# Patient Record
Sex: MALE | Race: AMERICAN INDIAN OR ALASKA NATIVE
[De-identification: names, ages, dates, MRNs, and addresses within clinical notes are randomized per-mention and may not be internally consistent; named-entity substitution may affect disease eponyms.]

---

## 2018-01-15 ENCOUNTER — HOSPITAL ENCOUNTER (EMERGENCY)
Dept: HOSPITAL 5 - ED | Age: 58
Discharge: HOME | End: 2018-01-15
Payer: COMMERCIAL

## 2018-01-15 VITALS — SYSTOLIC BLOOD PRESSURE: 106 MMHG | DIASTOLIC BLOOD PRESSURE: 69 MMHG

## 2018-01-15 DIAGNOSIS — Z79.899: ICD-10-CM

## 2018-01-15 DIAGNOSIS — R40.4: Primary | ICD-10-CM

## 2018-01-15 LAB
ALBUMIN SERPL-MCNC: 4.4 G/DL (ref 3.9–5)
ALT SERPL-CCNC: 32 UNITS/L (ref 7–56)
BACTERIA #/AREA URNS HPF: (no result) /HPF
BASOPHILS # (AUTO): 0.1 K/MM3 (ref 0–0.1)
BASOPHILS NFR BLD AUTO: 1 % (ref 0–1.8)
BENZODIAZEPINES SCREEN,URINE: (no result)
BILIRUB DIRECT SERPL-MCNC: < 0.2 MG/DL (ref 0–0.2)
BILIRUB UR QL STRIP: (no result)
BLOOD UR QL VISUAL: (no result)
BUN SERPL-MCNC: 14 MG/DL (ref 9–20)
BUN/CREAT SERPL: 11 %
CALCIUM SERPL-MCNC: 9 MG/DL (ref 8.4–10.2)
EOSINOPHIL # BLD AUTO: 0 K/MM3 (ref 0–0.4)
EOSINOPHIL NFR BLD AUTO: 0.4 % (ref 0–4.3)
HCT VFR BLD CALC: 45.8 % (ref 35.5–45.6)
HEMOLYSIS INDEX: 2
HGB BLD-MCNC: 15.4 GM/DL (ref 11.8–15.2)
HYALINE CASTS #/AREA URNS LPF: 8 /LPF
LYMPHOCYTES # BLD AUTO: 0.9 K/MM3 (ref 1.2–5.4)
LYMPHOCYTES NFR BLD AUTO: 13.6 % (ref 13.4–35)
MCH RBC QN AUTO: 31 PG (ref 28–32)
MCHC RBC AUTO-ENTMCNC: 34 % (ref 32–34)
MCV RBC AUTO: 91 FL (ref 84–94)
METHADONE SCREEN,URINE: (no result)
MONOCYTES # (AUTO): 0.5 K/MM3 (ref 0–0.8)
MONOCYTES % (AUTO): 7.2 % (ref 0–7.3)
MUCOUS THREADS #/AREA URNS HPF: (no result) /HPF
NITRITE UR QL STRIP: (no result)
OPIATE SCREEN,URINE: (no result)
PH UR STRIP: 5 [PH] (ref 5–7)
PLATELET # BLD: 141 K/MM3 (ref 140–440)
RBC # BLD AUTO: 5.06 M/MM3 (ref 3.65–5.03)
RBC #/AREA URNS HPF: 2 /HPF (ref 0–6)
UROBILINOGEN UR-MCNC: < 2 MG/DL (ref ?–2)
WBC #/AREA URNS HPF: 1 /HPF (ref 0–6)

## 2018-01-15 PROCEDURE — 93010 ELECTROCARDIOGRAM REPORT: CPT

## 2018-01-15 PROCEDURE — 93005 ELECTROCARDIOGRAM TRACING: CPT

## 2018-01-15 PROCEDURE — 99284 EMERGENCY DEPT VISIT MOD MDM: CPT

## 2018-01-15 PROCEDURE — 80074 ACUTE HEPATITIS PANEL: CPT

## 2018-01-15 PROCEDURE — 70450 CT HEAD/BRAIN W/O DYE: CPT

## 2018-01-15 PROCEDURE — 81001 URINALYSIS AUTO W/SCOPE: CPT

## 2018-01-15 PROCEDURE — 80307 DRUG TEST PRSMV CHEM ANLYZR: CPT

## 2018-01-15 PROCEDURE — 80048 BASIC METABOLIC PNL TOTAL CA: CPT

## 2018-01-15 PROCEDURE — 80320 DRUG SCREEN QUANTALCOHOLS: CPT

## 2018-01-15 PROCEDURE — 36415 COLL VENOUS BLD VENIPUNCTURE: CPT

## 2018-01-15 PROCEDURE — G0480 DRUG TEST DEF 1-7 CLASSES: HCPCS

## 2018-01-15 PROCEDURE — 85025 COMPLETE CBC W/AUTO DIFF WBC: CPT

## 2018-01-15 PROCEDURE — 71046 X-RAY EXAM CHEST 2 VIEWS: CPT

## 2018-01-15 NOTE — EMERGENCY DEPARTMENT REPORT
ED General Adult HPI





- General


Stated complaint: OVERDOSE


Time Seen by Provider: 01/15/18 10:00





- History of Present Illness


Initial comments: 


The only information I received on this patient was a triage form that states 

overdose.  I did not receive a paramedic report.  The charge nurse tells me 

that the paramedics informed them that that the patient reported to them that 

he took a "orange pill".  However at the time of my encounter the patient is 

totally nonverbal.  He is protecting his airway.  He is gesturing with his 

hands.  He is following commands.  He is not providing any historical 

information.





I received a call from the patient's family who state they are coming to the 

hospital but are more than 2 hours away.  They deny that this patient has any 

history of psychiatric or mental health issues or has been taking any related 

medication.  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 

receives care at.  They state the only medicine that he takes is something for 

high blood pressure.





-: unknown





- Related Data


 Allergies











Allergy/AdvReac Type Severity Reaction Status Date / Time


 


No Known Allergies Allergy   Verified 01/15/18 09:35














ED Review of Systems


ROS: 


Stated complaint: OVERDOSE


Other details as noted in HPI





Comment: Unobtainable due to pts medical conditions





ED Past Medical Hx





- Past Medical History


Hx Hypertension: Yes





- Surgical History


Past Surgical History?: No





- Social History


Substance Use Type: Other (unknown see above history of "orange pill")





ED Physical Exam





- General


Limitations: Altered Mental Status


General appearance: alert, in no apparent distress





- Head


Head exam: Present: atraumatic, normocephalic





- Eye


Eye exam: Present: normal appearance.  Absent: scleral icterus





- ENT


ENT exam: Present: mucous membranes moist





- Neck


Neck exam: Present: normal inspection





- Respiratory


Respiratory exam: Present: normal lung sounds bilaterally.  Absent: respiratory 

distress





- Cardiovascular


Cardiovascular Exam: Present: regular rate, normal rhythm.  Absent: systolic 

murmur, diastolic murmur, rubs, gallop





- GI/Abdominal


GI/Abdominal exam: Present: soft, normal bowel sounds.  Absent: distended, 

tenderness, guarding, rebound, rigid





- Rectal


Rectal exam: Present: deferred





- Extremities Exam


Extremities exam: Present: normal inspection





- Back Exam


Back exam: Present: normal inspection





- Neurological Exam


Neurological exam: Present: alert, oriented X3, CN II-XII intact (on limited 

exam).  Absent: motor sensory deficit





- Psychiatric


Psychiatric exam: Present: depressed, flat affect, other (patient will follow 

basic commands.  However he does not respond verbally.  He gestures but does 

not speak.)





- Skin


Skin exam: Present: warm, dry, intact, normal color.  Absent: rash





ED Course





- Reevaluation(s)


Reevaluation #1: 


Patient will be worked up for altered mental status to include CT evaluation.  

The patient is not communicating with the nurse somewhat coherently.  He states 

that his son gave him something to eat which was milky with black dots.  He 

states that he tried it and then 40 minutes later he started getting hot and 

dizzy.  Apparently he experienced some vertigo as well.  EMS was summoned.


01/15/18 10:54





Reevaluation #2: 


The patient shared with the  that he consumes some sort of 

substance laced candy bar.  Whether it contained THC or not I do not know.  

However his marijuana screen is negative.  The patient has been fully 

ambulatory.  On reexamination he states that he feels better and he is ready to 

go home.  He is appropriate for outpatient disposition.


01/15/18 14:46








ED Medical Decision Making





- Lab Data


Result diagrams: 


 01/15/18 10:46





 01/15/18 10:46








 Laboratory Results - last 24 hr











  01/15/18 01/15/18 01/15/18





  10:46 10:46 10:46


 


WBC   


 


RBC   


 


Hgb   


 


Hct   


 


MCV   


 


MCH   


 


MCHC   


 


RDW   


 


Plt Count   


 


Lymph % (Auto)   


 


Mono % (Auto)   


 


Eos % (Auto)   


 


Baso % (Auto)   


 


Lymph #   


 


Mono #   


 


Eos #   


 


Baso #   


 


Seg Neutrophils %   


 


Seg Neutrophils #   


 


Sodium  140  


 


Potassium  3.8  


 


Chloride  99.5  


 


Carbon Dioxide  26  


 


Anion Gap  18  


 


BUN  14  


 


Creatinine  1.3  


 


Estimated GFR  57  


 


BUN/Creatinine Ratio  11  


 


Glucose  159 H  


 


Calcium  9.0  


 


Total Bilirubin    0.40


 


Direct Bilirubin    < 0.2


 


AST    31


 


ALT    32


 


Alkaline Phosphatase    75


 


Total Protein    7.3


 


Albumin    4.4


 


Albumin/Globulin Ratio    1.5


 


Plasma/Serum Alcohol   < 0.01 














  01/15/18





  10:46


 


WBC  6.6


 


RBC  5.06 H


 


Hgb  15.4 H


 


Hct  45.8 H


 


MCV  91


 


MCH  31


 


MCHC  34


 


RDW  13.8


 


Plt Count  141


 


Lymph % (Auto)  13.6


 


Mono % (Auto)  7.2


 


Eos % (Auto)  0.4


 


Baso % (Auto)  1.0


 


Lymph #  0.9 L


 


Mono #  0.5


 


Eos #  0.0


 


Baso #  0.1


 


Seg Neutrophils %  77.8 H


 


Seg Neutrophils #  5.1


 


Sodium 


 


Potassium 


 


Chloride 


 


Carbon Dioxide 


 


Anion Gap 


 


BUN 


 


Creatinine 


 


Estimated GFR 


 


BUN/Creatinine Ratio 


 


Glucose 


 


Calcium 


 


Total Bilirubin 


 


Direct Bilirubin 


 


AST 


 


ALT 


 


Alkaline Phosphatase 


 


Total Protein 


 


Albumin 


 


Albumin/Globulin Ratio 


 


Plasma/Serum Alcohol 














 Laboratory Results - last 24 hr











  01/15/18 01/15/18 01/15/18





  10:46 10:46 10:46


 


WBC   


 


RBC   


 


Hgb   


 


Hct   


 


MCV   


 


MCH   


 


MCHC   


 


RDW   


 


Plt Count   


 


Lymph % (Auto)   


 


Mono % (Auto)   


 


Eos % (Auto)   


 


Baso % (Auto)   


 


Lymph #   


 


Mono #   


 


Eos #   


 


Baso #   


 


Seg Neutrophils %   


 


Seg Neutrophils #   


 


Sodium    140


 


Potassium    3.8


 


Chloride    99.5


 


Carbon Dioxide    26


 


Anion Gap    18


 


BUN    14


 


Creatinine    1.3


 


Estimated GFR    57


 


BUN/Creatinine Ratio    11


 


Glucose    159 H


 


Calcium    9.0


 


Total Bilirubin   


 


Direct Bilirubin   


 


AST   


 


ALT   


 


Alkaline Phosphatase   


 


Total Protein   


 


Albumin   


 


Albumin/Globulin Ratio   


 


Urine Color   


 


Urine Turbidity   


 


Urine pH   


 


Ur Specific Gravity   


 


Urine Protein   


 


Urine Glucose (UA)   


 


Urine Ketones   


 


Urine Blood   


 


Urine Nitrite   


 


Urine Bilirubin   


 


Urine Urobilinogen   


 


Ur Leukocyte Esterase   


 


Urine WBC (Auto)   


 


Urine RBC (Auto)   


 


Urine Bacteria (Auto)   


 


Hyaline Casts   


 


Urine Mucus   


 


Salicylates  < 0.3 L  


 


Urine Opiates Screen   


 


Urine Methadone Screen   


 


Acetaminophen   < 15.0 


 


Ur Barbiturates Screen   


 


Ur Phencyclidine Scrn   


 


Ur Amphetamines Screen   


 


U Benzodiazepines Scrn   


 


Urine Cocaine Screen   


 


U Marijuana (THC) Screen   


 


Drugs of Abuse Note   


 


Plasma/Serum Alcohol   














  01/15/18 01/15/18 01/15/18





  10:46 10:46 10:46


 


WBC    6.6


 


RBC    5.06 H


 


Hgb    15.4 H


 


Hct    45.8 H


 


MCV    91


 


MCH    31


 


MCHC    34


 


RDW    13.8


 


Plt Count    141


 


Lymph % (Auto)    13.6


 


Mono % (Auto)    7.2


 


Eos % (Auto)    0.4


 


Baso % (Auto)    1.0


 


Lymph #    0.9 L


 


Mono #    0.5


 


Eos #    0.0


 


Baso #    0.1


 


Seg Neutrophils %    77.8 H


 


Seg Neutrophils #    5.1


 


Sodium   


 


Potassium   


 


Chloride   


 


Carbon Dioxide   


 


Anion Gap   


 


BUN   


 


Creatinine   


 


Estimated GFR   


 


BUN/Creatinine Ratio   


 


Glucose   


 


Calcium   


 


Total Bilirubin   0.40 


 


Direct Bilirubin   < 0.2 


 


AST   31 


 


ALT   32 


 


Alkaline Phosphatase   75 


 


Total Protein   7.3 


 


Albumin   4.4 


 


Albumin/Globulin Ratio   1.5 


 


Urine Color   


 


Urine Turbidity   


 


Urine pH   


 


Ur Specific Gravity   


 


Urine Protein   


 


Urine Glucose (UA)   


 


Urine Ketones   


 


Urine Blood   


 


Urine Nitrite   


 


Urine Bilirubin   


 


Urine Urobilinogen   


 


Ur Leukocyte Esterase   


 


Urine WBC (Auto)   


 


Urine RBC (Auto)   


 


Urine Bacteria (Auto)   


 


Hyaline Casts   


 


Urine Mucus   


 


Salicylates   


 


Urine Opiates Screen   


 


Urine Methadone Screen   


 


Acetaminophen   


 


Ur Barbiturates Screen   


 


Ur Phencyclidine Scrn   


 


Ur Amphetamines Screen   


 


U Benzodiazepines Scrn   


 


Urine Cocaine Screen   


 


U Marijuana (THC) Screen   


 


Drugs of Abuse Note   


 


Plasma/Serum Alcohol  < 0.01  














  01/15/18 01/15/18





  Unknown Unknown


 


WBC  


 


RBC  


 


Hgb  


 


Hct  


 


MCV  


 


MCH  


 


MCHC  


 


RDW  


 


Plt Count  


 


Lymph % (Auto)  


 


Mono % (Auto)  


 


Eos % (Auto)  


 


Baso % (Auto)  


 


Lymph #  


 


Mono #  


 


Eos #  


 


Baso #  


 


Seg Neutrophils %  


 


Seg Neutrophils #  


 


Sodium  


 


Potassium  


 


Chloride  


 


Carbon Dioxide  


 


Anion Gap  


 


BUN  


 


Creatinine  


 


Estimated GFR  


 


BUN/Creatinine Ratio  


 


Glucose  


 


Calcium  


 


Total Bilirubin  


 


Direct Bilirubin  


 


AST  


 


ALT  


 


Alkaline Phosphatase  


 


Total Protein  


 


Albumin  


 


Albumin/Globulin Ratio  


 


Urine Color  Yellow 


 


Urine Turbidity  Clear 


 


Urine pH  5.0 


 


Ur Specific Gravity  1.027 


 


Urine Protein  100 mg/dl 


 


Urine Glucose (UA)  Neg 


 


Urine Ketones  Neg 


 


Urine Blood  Neg 


 


Urine Nitrite  Neg 


 


Urine Bilirubin  Neg 


 


Urine Urobilinogen  < 2.0 


 


Ur Leukocyte Esterase  Neg 


 


Urine WBC (Auto)  1.0 


 


Urine RBC (Auto)  2.0 


 


Urine Bacteria (Auto)  1+ 


 


Hyaline Casts  8 


 


Urine Mucus  2+ 


 


Salicylates  


 


Urine Opiates Screen   Presumptive negative


 


Urine Methadone Screen   Presumptive negative


 


Acetaminophen  


 


Ur Barbiturates Screen   Presumptive negative


 


Ur Phencyclidine Scrn   Presumptive negative


 


Ur Amphetamines Screen   Presumptive negative


 


U Benzodiazepines Scrn   Presumptive negative


 


Urine Cocaine Screen   Presumptive negative


 


U Marijuana (THC) Screen   Presumptive negative


 


Drugs of Abuse Note   Disclamer


 


Plasma/Serum Alcohol  














- Radiology Data


Radiology results: report reviewed (CT of the head and plain chest x-ray showed 

no acute process)


Critical care attestation.: 


If time is entered above; I have spent that time in minutes in the direct care 

of this critically ill patient, excluding procedure time.








ED Disposition


Clinical Impression: 


Altered mental status


Qualifiers:


 Altered mental status type: transient alteration of awareness Qualified Code(s)

: R40.4 - Transient alteration of awareness





Disposition: DC-01 TO HOME OR SELFCARE


Is pt being admited?: No


Does the pt Need Aspirin: No


Condition: Stable


Instructions:  Altered Mental Status (ED)


Additional Instructions: 


[facility] or [facility].  See referral for a local 

clinic.  Return if any further problems as needed today.


Referrals: 


[facility]ARE,MD [Primary Care Provider] - 3-5 Days


[facility] [Provider Group] - 2-3 Days


Time of Disposition: 14:48

## 2018-01-15 NOTE — XRAY REPORT
AP CHEST:



HISTORY: Hypertension



AP view of the chest demonstrates a normal mediastinal and

cardiac contour with clear lungs and normal bony and soft tissue

structures.



IMPRESSION:

Unremarkable AP chest.

## 2018-01-15 NOTE — CAT SCAN REPORT
CT HEAD WITHOUT CONTRAST:



HISTORY:  Altered mental status.



TECHNIQUE:  Sequential 2.5mm CT images.



COMPARISON: none.



FINDINGS:



Cerebral Parenchyma: Mild to moderate hypoattenuation is identified in 

the white matter bilaterally. This is a nonspecific finding but is most 

likely related to chronic small vessel disease. The remaining brain 

parenchyma has normal attenuation. The gray-white interface is well 

defined.



Cerebellum:  Within normal limits.



Brainstem:  Within normal limits.



Ventricles: Normal.



Sella:  Normal.



Extra-axial spaces:  Normal.



Basal Cisterns:  Normal.



Intracranial Hemorrhage:  None.



Midline Shift:  None.



Calvarium: Normal.



Sinuses: Normal.



Mastoid Air Cells:  Normal.



Visualized Orbits:  Normal.







IMPRESSION:

Nonspecific white matter changes as described above. No acute 

intracranial process is appreciated.